# Patient Record
Sex: MALE | Race: OTHER | Employment: FULL TIME | URBAN - METROPOLITAN AREA
[De-identification: names, ages, dates, MRNs, and addresses within clinical notes are randomized per-mention and may not be internally consistent; named-entity substitution may affect disease eponyms.]

---

## 2020-08-16 ENCOUNTER — HOSPITAL ENCOUNTER (EMERGENCY)
Age: 20
Discharge: HOME | End: 2020-08-16
Attending: EMERGENCY MEDICINE

## 2020-08-16 VITALS
HEIGHT: 71 IN | OXYGEN SATURATION: 99 % | HEART RATE: 60 BPM | TEMPERATURE: 98.9 F | RESPIRATION RATE: 24 BRPM | BODY MASS INDEX: 32.62 KG/M2 | SYSTOLIC BLOOD PRESSURE: 115 MMHG | WEIGHT: 233.03 LBS | DIASTOLIC BLOOD PRESSURE: 67 MMHG

## 2020-08-16 PROCEDURE — 99282 EMERGENCY DEPT VISIT SF MDM: CPT

## 2020-08-16 RX ORDER — AMOXICILLIN AND CLAVULANATE POTASSIUM 875; 125 MG/1; MG/1
1 TABLET, FILM COATED ORAL 2 TIMES DAILY
Qty: 20 TABLET | Refills: 0 | Status: SHIPPED | OUTPATIENT
Start: 2020-08-16 | End: 2020-08-26

## 2020-08-16 NOTE — ED NOTES
Patient states his dog bit him in the left thumb last night. Superficial laceration scratches to left thumb and hand. Areas cleaned, neosporin and band aid applied followed by ace wrap.      Yesenia Noriega RN  08/16/20 7379

## 2020-08-16 NOTE — ED PROVIDER NOTES
eMERGENCY dEPARTMENT eNCOUnter      Pt Name: Larry Flores  MRN: [id number]  [place] 2000  Date of evaluation: 8/16/2020  Provider: Bren Steward MD     CHIEF COMPLAINT       Chief Complaint   Patient presents with   [address] by his dog last night on his right hand - Not actively bleeding - Dog up to date on shots - PT up to date on tetanus          HISTORY OF PRESENT ILLNESS   (Location/Symptom, Timing/Onset,Context/Setting, Quality, Duration, Modifying Factors, Severity) Note limiting factors. HPI    Larry Flores is a 23 y.o. male who presents to the emergency department with a dog bite started yesterday. Patient states his dog was running through the garbage she tried to reach and and the dog bit him in the right hand. There was distal superficial puncture wounds and scratches noted. Patient missed work today and requested a work note. Patient otherwise tetanus is up-to-date. Patient denies any redness or pain. Mild swelling noted. There was no other injury no active bleeding. Nursing Notes were reviewed. REVIEW OFSYSTEMS    (2+ for level 4; 10+ for level 5)   Review of Systems    General: No fevers, chills or night sweats, No weight loss    Head:  No Sore throat,  No Ear Pain    Chest:  Nontender. No Cough, No SOB,  Chest Pain    GI: No abdominal pain or vomiting    : No dysuria or hematuria    Musculoskeletal: No unrelenting pain or night pain    Neurologic: No bowel or bladder incontinence, No saddle anesthesia, No leg weakness    All other systems reviewed and are negative. PAST MEDICAL HISTORY     Past Medical History:   Diagnosis Date    Allergic rhinitis        SURGICAL HISTORY     History reviewed. No pertinent surgical history. CURRENT MEDICATIONS       Discharge Medication List as of 8/16/2020  4:12 PM          ALLERGIES     Patient has no known allergies. FAMILY HISTORY     History reviewed. No pertinent family history.      SOCIAL HISTORY Social History     Socioeconomic History    Marital status: Single     Spouse name: None    Number of children: None    Years of education: None    Highest education level: None   Occupational History    None   Social Needs    Financial resource strain: None    Food insecurity     Worry: None     Inability: None    Transportation needs     Medical: None     Non-medical: None   Tobacco Use    Smoking status: Passive Smoke Exposure - Never Smoker    Smokeless tobacco: Never Used   Substance and Sexual Activity    Alcohol use: No    Drug use: No    Sexual activity: Not Currently   Lifestyle    Physical activity     Days per week: None     Minutes per session: None    Stress: None   Relationships    Social connections     Talks on phone: None     Gets together: None     Attends Druze service: None     Active member of club or organization: None     Attends meetings of clubs or organizations: None     Relationship status: None    Intimate partner violence     Fear of current or ex partner: None     Emotionally abused: None     Physically abused: None     Forced sexual activity: None   Other Topics Concern    None   Social History Narrative    None       SCREENINGS           PHYSICAL EXAM    (up to 7 for level 4, 8 or more for level 5)     ED Triage Vitals [08/16/20 1553]   BP Temp Temp Source Pulse Resp SpO2 Height Weight   115/67 98.9 °F (37.2 °C) Oral 60 24 99 % 5' 11\" (1.803 m) 233 lb 0.4 oz (105.7 kg)       Physical Exam    General: Alert and awake ×3. Nontoxic appearance. Well-developed well-nourished healthy 65-year-old male in no distress  HEENT: Normocephalic atraumatic. Neck is supple. Airway intact. No adenopathy  Cardiac: Regular rate and rhythm with no murmurs rubs or gallops  Pulmonary: Lungs are clear in all lung fields. No wheezing. No Rales. Abdomen: Soft and nontender. Negative hepatosplenomegaly. Bowel sounds are active  Extremities: Moving all extremities.   No  calf tenderness. Peripheral pulses all intact  Skin: No skin lesions. No rashes. The skin on the right hand shows couple small puncture wounds from scratches and abrasion. Nothing that requires sutures. No evidence of cellulitis or adenitis. No foreign body seen. No x-ray indicated. Neurologic: Cranial nerves II through XII was grossly intact. Nonfocal neurological exam  Psychiatric: Patient is pleasant. Mood is appropriate. DIAGNOSTIC RESULTS     EKG (Per Emergency Physician):       RADIOLOGY (Per Emergency Physician): Interpretation per the Radiologist below, if available at the time of this note:  No results found. ED BEDSIDE ULTRASOUND:   Performed by ED Physician - none    LABS:  Labs Reviewed - No data to display     All other labs were within normal range or not returned as of this dictation. Procedures      EMERGENCY DEPARTMENT COURSE and DIFFERENTIAL DIAGNOSIS/MDM:   Vitals:    Vitals:    08/16/20 1553   BP: 115/67   Pulse: 60   Resp: 24   Temp: 98.9 °F (37.2 °C)   TempSrc: Oral   SpO2: 99%   Weight: 233 lb 0.4 oz (105.7 kg)   Height: 5' 11\" (1.803 m)       Medications - No data to display    MDM. Patient is a 76-year-old with a dog bite to the right hand. Patient denies any further injury. Exam is unremarkable except for the redness and a small puncture wound. Patient will be placed on Augmentin for 10 days. Work note for today. Patient discharged in good condition. REVAL:         CRITICAL CARE TIME   Total CriticalCare time was 0 minutes, excluding separately reportable procedures. There was a high probability of clinically significant/life threatening deterioration in the patient's condition which required my urgent intervention. CONSULTS:  None    PROCEDURES:  Unless otherwise noted below, none     [unfilled]    FINAL IMPRESSION      1. Puncture wound of right hand without foreign body, initial encounter    2.  Dog bite, initial encounter          DISPOSITION/PLAN DISPOSITION Decision To Discharge 08/16/2020 04:04:57 PM      PATIENT REFERRED TO:  MILADY [address]  [address] [facility]  [phone]    Schedule an appointment as soon as possible for a visit in 1 week  If symptoms worsen      DISCHARGE MEDICATIONS:  Discharge Medication List as of 8/16/2020  4:12 PM      START taking these medications    Details   amoxicillin-clavulanate (AUGMENTIN) 875-125 MG per tablet Take 1 tablet by mouth 2 times daily for 10 days, Disp-20 tablet,R-0Print                (Please note:  Portions of this note were completed with a voice recognition program.Efforts were made to edit the dictations but occasionally words and phrases are mis-transcribed.)  Form v2016. J.5-cn    RIVKA CALZADA MD (electronically signed)  Emergency Medicine Provider        Monse Acosta MD  08/16/20 9545

## 2020-08-16 NOTE — ED NOTES
AVS reviewed with patient. Verbalized understanding. AVS was printed and given to patient. Printed prescription given to patient.      Christine Huynh RN  08/16/20 2553

## 2021-06-15 ENCOUNTER — APPOINTMENT (OUTPATIENT)
Dept: GENERAL RADIOLOGY | Age: 21
End: 2021-06-15

## 2021-06-15 ENCOUNTER — HOSPITAL ENCOUNTER (EMERGENCY)
Age: 21
Discharge: HOME | End: 2021-06-15
Attending: EMERGENCY MEDICINE

## 2021-06-15 VITALS
DIASTOLIC BLOOD PRESSURE: 82 MMHG | HEIGHT: 72 IN | OXYGEN SATURATION: 100 % | BODY MASS INDEX: 35.41 KG/M2 | RESPIRATION RATE: 14 BRPM | TEMPERATURE: 98.3 F | HEART RATE: 58 BPM | WEIGHT: 261.47 LBS | SYSTOLIC BLOOD PRESSURE: 129 MMHG

## 2021-06-15 DIAGNOSIS — S60.00XA CONTUSION OF RIGHT HAND INCLUDING FINGERS, INITIAL ENCOUNTER: Primary | ICD-10-CM

## 2021-06-15 DIAGNOSIS — S60.221A CONTUSION OF RIGHT HAND INCLUDING FINGERS, INITIAL ENCOUNTER: Primary | ICD-10-CM

## 2021-06-15 PROCEDURE — 6370000000 HC RX 637 (ALT 250 FOR IP): Performed by: EMERGENCY MEDICINE

## 2021-06-15 PROCEDURE — 99284 EMERGENCY DEPT VISIT MOD MDM: CPT

## 2021-06-15 PROCEDURE — 73130 X-RAY EXAM OF HAND: CPT

## 2021-06-15 RX ORDER — NAPROXEN 250 MG/1
500 TABLET ORAL ONCE
Status: COMPLETED | OUTPATIENT
Start: 2021-06-15 | End: 2021-06-15

## 2021-06-15 RX ORDER — NAPROXEN 500 MG/1
500 TABLET ORAL 2 TIMES DAILY WITH MEALS
Qty: 20 TABLET | Refills: 0 | Status: SHIPPED | OUTPATIENT
Start: 2021-06-15 | End: 2021-06-25

## 2021-06-15 RX ADMIN — NAPROXEN 500 MG: 250 TABLET ORAL at 10:18

## 2021-06-15 ASSESSMENT — PAIN DESCRIPTION - LOCATION: LOCATION: HAND

## 2021-06-15 ASSESSMENT — PAIN DESCRIPTION - DESCRIPTORS: DESCRIPTORS: ACHING;SORE

## 2021-06-15 ASSESSMENT — PAIN DESCRIPTION - ORIENTATION: ORIENTATION: RIGHT

## 2021-06-15 ASSESSMENT — PAIN SCALES - GENERAL
PAINLEVEL_OUTOF10: 6
PAINLEVEL_OUTOF10: 6

## 2021-06-15 ASSESSMENT — PAIN DESCRIPTION - FREQUENCY: FREQUENCY: CONTINUOUS

## 2021-06-15 ASSESSMENT — PAIN DESCRIPTION - PROGRESSION: CLINICAL_PROGRESSION: NOT CHANGED

## 2021-06-15 ASSESSMENT — PAIN DESCRIPTION - ONSET: ONSET: ON-GOING

## 2021-06-15 ASSESSMENT — PAIN DESCRIPTION - PAIN TYPE: TYPE: ACUTE PAIN

## 2021-06-15 ASSESSMENT — PAIN - FUNCTIONAL ASSESSMENT: PAIN_FUNCTIONAL_ASSESSMENT: 0-10
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
## 2021-06-15 NOTE — ED NOTES
Dc'd to home  Awake alert  resp easy ad unlabored  Skin warm and dry  Aware to use hand but to rest it  To ice and elevate  Rest hand  Work note given  Walked out with ease     Troy Fitzpatrick RN  06/15/21 9817

## 2021-06-15 NOTE — LETTER
[facility] [address]  Phone: [phone]               Vivienne 15, 2021    Patient: Rory Fontanez   YOB: 2000   Date of Visit: 6/15/2021       To Whom It May Concern:    Rory oFntanez was seen and treated in our emergency department on 6/15/2021. He may return to work on June 16th. Off work June 14th and 15th.       Sincerely,       Raissa Adrian RN         Signature:__________________________________

## 2021-11-18 ENCOUNTER — HOSPITAL ENCOUNTER (EMERGENCY)
Age: 21
Discharge: HOME | End: 2021-11-18
Attending: EMERGENCY MEDICINE

## 2021-11-18 VITALS
BODY MASS INDEX: 38.33 KG/M2 | TEMPERATURE: 97.8 F | RESPIRATION RATE: 19 BRPM | WEIGHT: 258.82 LBS | HEART RATE: 94 BPM | OXYGEN SATURATION: 100 % | SYSTOLIC BLOOD PRESSURE: 127 MMHG | DIASTOLIC BLOOD PRESSURE: 66 MMHG | HEIGHT: 69 IN
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
## 2021-11-18 DIAGNOSIS — S40.022A ARM CONTUSION, LEFT, INITIAL ENCOUNTER: Primary | ICD-10-CM

## 2021-11-18 PROCEDURE — 99283 EMERGENCY DEPT VISIT LOW MDM: CPT

## 2021-11-18 ASSESSMENT — PAIN DESCRIPTION - ORIENTATION: ORIENTATION: LEFT

## 2021-11-18 ASSESSMENT — PAIN SCALES - GENERAL
PAINLEVEL_OUTOF10: 6
PAINLEVEL_OUTOF10: 6

## 2021-11-18 ASSESSMENT — PAIN DESCRIPTION - PAIN TYPE: TYPE: ACUTE PAIN

## 2021-11-18 ASSESSMENT — PAIN DESCRIPTION - LOCATION: LOCATION: ARM

## 2021-11-18 ASSESSMENT — PAIN - FUNCTIONAL ASSESSMENT: PAIN_FUNCTIONAL_ASSESSMENT: 0-10

## 2021-11-18 ASSESSMENT — PAIN DESCRIPTION - DESCRIPTORS: DESCRIPTORS: DULL
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
## 2021-11-18 NOTE — Clinical Note
Fercho Bergman was seen and treated in our emergency department on 11/18/2021. He may return to work on 11/19/2021. If you have any questions or concerns, please don't hesitate to call.       Shant Dotson MD

## 2021-12-29 ENCOUNTER — HOSPITAL ENCOUNTER (EMERGENCY)
Age: 21
Discharge: HOME | End: 2021-12-29
Attending: EMERGENCY MEDICINE

## 2021-12-29 VITALS
TEMPERATURE: 98.1 F | HEART RATE: 72 BPM | BODY MASS INDEX: 37.98 KG/M2 | HEIGHT: 69 IN | WEIGHT: 256.39 LBS | RESPIRATION RATE: 16 BRPM | OXYGEN SATURATION: 98 % | DIASTOLIC BLOOD PRESSURE: 89 MMHG | SYSTOLIC BLOOD PRESSURE: 145 MMHG

## 2021-12-29 DIAGNOSIS — U07.1 COVID-19 VIRUS INFECTION: Primary | ICD-10-CM

## 2021-12-29 PROCEDURE — U0005 INFEC AGEN DETEC AMPLI PROBE: HCPCS

## 2021-12-29 PROCEDURE — U0003 INFECTIOUS AGENT DETECTION BY NUCLEIC ACID (DNA OR RNA); SEVERE ACUTE RESPIRATORY SYNDROME CORONAVIRUS 2 (SARS-COV-2) (CORONAVIRUS DISEASE [COVID-19]), AMPLIFIED PROBE TECHNIQUE, MAKING USE OF HIGH THROUGHPUT TECHNOLOGIES AS DESCRIBED BY CMS-2020-01-R: HCPCS

## 2021-12-29 PROCEDURE — 99283 EMERGENCY DEPT VISIT LOW MDM: CPT

## 2021-12-29 ASSESSMENT — PAIN DESCRIPTION - PAIN TYPE
TYPE: ACUTE PAIN
TYPE: ACUTE PAIN

## 2021-12-29 ASSESSMENT — PAIN DESCRIPTION - ONSET
ONSET: ON-GOING
ONSET: ON-GOING

## 2021-12-29 ASSESSMENT — PAIN SCALES - GENERAL
PAINLEVEL_OUTOF10: 7
PAINLEVEL_OUTOF10: 7

## 2021-12-29 ASSESSMENT — PAIN - FUNCTIONAL ASSESSMENT
PAIN_FUNCTIONAL_ASSESSMENT: ACTIVITIES ARE NOT PREVENTED
PAIN_FUNCTIONAL_ASSESSMENT: ACTIVITIES ARE NOT PREVENTED
PAIN_FUNCTIONAL_ASSESSMENT: 0-10

## 2021-12-29 ASSESSMENT — PAIN DESCRIPTION - LOCATION
LOCATION: GENERALIZED
LOCATION: GENERALIZED

## 2021-12-29 ASSESSMENT — PAIN DESCRIPTION - DESCRIPTORS
DESCRIPTORS: ACHING
DESCRIPTORS: ACHING

## 2021-12-29 NOTE — Clinical Note
Katheryn Briceno was seen and treated in our emergency department on 12/29/2021. He may return to work on 01/05/2022. If you have any questions or concerns, please don't hesitate to call.       Lane Carson MD

## 2021-12-30 ENCOUNTER — CARE COORDINATION (OUTPATIENT)
Dept: CARE COORDINATION | Age: 21
End: 2021-12-30

## 2021-12-30 LAB — SARS-COV-2: DETECTED

## 2022-01-01 NOTE — RESULT ENCOUNTER NOTE
The patient is aware of positive results due to log in to My Chart on 12/30/21. The COVID-19 test result was positive  Treatment of coronavirus does not require an antibiotic  Remain isolated for 10 days since symptoms first appeared AND At least 3 days have passed after recovery (Recovery is defined as resolution of fever without the use of fever-reducing medications with progressive improvement or resolution of other symptoms). Wash hands often with soap and water for at least 20 seconds or alternatively use hand  with at least 60% alcohol content  Cover coughs and sneezes  Wear a mask when around others if possible  Clean all \"high-touch\" surfaces every day, such as doorknobs and cellphones  Continually monitor symptoms. Contact a medical provider if symptoms are worsening, such as difficulty breathing. For additional information, please visit the Centers for Disease Control and Prevention ([facility].cy.

## 2022-12-31 ENCOUNTER — HOSPITAL ENCOUNTER (EMERGENCY)
Age: 22
Discharge: HOME | End: 2022-12-31

## 2022-12-31 VITALS
DIASTOLIC BLOOD PRESSURE: 84 MMHG | BODY MASS INDEX: 40.49 KG/M2 | RESPIRATION RATE: 17 BRPM | HEIGHT: 69 IN | HEART RATE: 72 BPM | WEIGHT: 273.37 LBS | TEMPERATURE: 98.6 F | OXYGEN SATURATION: 97 % | SYSTOLIC BLOOD PRESSURE: 132 MMHG

## 2022-12-31 DIAGNOSIS — H57.12 LEFT EYE PAIN: Primary | ICD-10-CM

## 2022-12-31 PROCEDURE — 99282 EMERGENCY DEPT VISIT SF MDM: CPT

## 2022-12-31 NOTE — ED NOTES
Patient presents to ED with L eye pain. He states he had a migraine earlier in the day and it made his vision \"a little foggy\". He states he also needs glasses but hasn't gotten them since. He states that the pain is gone at this point, but his migraine is lingering a bit. He states he got some relief when he put sunglasses on. States his boss wanted him to come get checked out. He is alert and oriented x4, vitals are WNL, airway is patent.       Corrina Ornelas RN  12/31/22 9345

## 2022-12-31 NOTE — ED PROVIDER NOTES
[address]        Pt Name: May Warren  MRN: [id number]  [place] 2000  Date of evaluation: 12/31/2022  Provider: PRASHANT Devries      ADVANCED PRACTICE PROVIDER, I HAVE EVALUATED THIS PATIENT    CHIEF COMPLAINT     Left eye pain      HISTORY OF PRESENT ILLNESS  (Location/Symptom, Timing/Onset, Context/Setting, Quality, Duration,Modifying Factors, Severity.)   May Warren is a 20 y.o. male who presents to the emergency department for left eye pain and headache. Reports he was at work about 1 hour ago and he started with left eye pain, light sensitivity, blurred vision, and then headache. He works at the [employer] so there are a lot of lights there which made symptoms worse. He put his sunglasses on and symptoms improved. Denies prior episodes. Reports he has been having difficulty with vision for awhile now and needs to get glasses. Does not have insurance so has not seen eye doctor yet. Has been squinting at lot at work. Symptoms started after being at work all day. Denies seeing spots or floaters or curtains falling. Denies nausea vomiting. Denies health problems. Does not take any medications. Denies ASA or anticoagulant use. Reports symptoms lasted for about 20 minutes and resolved. He denies eye pain or blurred vision or headache now. Feels normal now. Nursing Notes were reviewed and I agree. REVIEW OF SYSTEMS    (2-9 systems for level 4, 10 or more for level 5)   Review of Systems     Pertinent positives and negatives as per HPI. All other systems reviewed and are negative except as noted    PAST MEDICAL HISTORY         Diagnosis Date    Allergic rhinitis        SURGICAL HISTORY   History reviewed. No pertinent surgical history.     CURRENT MEDICATIONS       Discharge Medication List as of 12/31/2022  7:03 PM        CONTINUE these medications which have NOT CHANGED    Details   naproxen (NAPROSYN) 500 MG tablet Take 1 tablet by mouth 2 times daily (with meals) for 10 days, Disp-20 tablet, R-0Print             ALLERGIES     Patient has no known allergies. FAMILY HISTORY     History reviewed. No pertinent family history. No family status information on file. SOCIAL HISTORY      reports that he is a non-smoker but has been exposed to tobacco smoke. He has never used smokeless tobacco. He reports that he does not drink alcohol and does not use drugs. PHYSICAL EXAM    (up to 7 for level 4, 8 or more for level 5)     ED Triage Vitals [12/31/22 1824]   BP Temp Temp Source Heart Rate Resp SpO2 Height Weight   132/84 98.6 °F (37 °C) Oral 72 17 97 % 5' 9\" (1.753 m) 273 lb 5.9 oz (124 kg)       Physical Exam  Constitutional:       General: He is not in acute distress. Appearance: Normal appearance. He is well-developed. He is not ill-appearing, toxic-appearing or diaphoretic. HENT:      Head: Normocephalic and atraumatic. Eyes:      Extraocular Movements: Extraocular movements intact. Conjunctiva/sclera: Conjunctivae normal.      Pupils: Pupils are equal, round, and reactive to light. Comments: No nystagmus   Pulmonary:      Effort: Pulmonary effort is normal. No respiratory distress. Musculoskeletal:         General: Normal range of motion. Cervical back: Normal range of motion and neck supple. Skin:     General: Skin is warm. Neurological:      Mental Status: He is alert. Comments: No facial drooping  Normal finger to nose bilaterally   Psychiatric:         Mood and Affect: Mood normal.         Behavior: Behavior normal.         Thought Content:  Thought content normal.         Judgment: Judgment normal.       DIFFERENTIAL DIAGNOSIS   Atypical migraine, aura, glaucoma, retinal artery occlusion, dissection, intracranial mass/bleed, other    DIAGNOSTICRESULTS         RADIOLOGY:   Non-plain film images such as CT, Ultrasound and MRI are read by the radiologist. Plain radiographic images are visualized and preliminarily interpreted by PRASHANT Cuevas with the below findings:      Interpretation per the Radiologist below, if available at the time of this note:    No orders to display         LABS:  Labs Reviewed - No data to display    All other labs were within normal range or not returned as of this dictation. EMERGENCY DEPARTMENT COURSE and DIFFERENTIALDIAGNOSIS/MDM:   Vitals:    Vitals:    12/31/22 1824   BP: 132/84   Pulse: 72   Resp: 17   Temp: 98.6 °F (37 °C)   TempSrc: Oral   SpO2: 97%   Weight: 273 lb 5.9 oz (124 kg)   Height: 5' 9\" (1.753 m)       Patient wasnontoxic, well appearing, afebrile with normal vital signs. Saturating well on room air. Presented for left eye pain, light sensitivity, blurred vision and headache in the setting of being at work at the [employer] with lots of lights and squinting all day. He currently denies headache or vision changes. He does not think he needs to be in ED but his boss encouraged him to come so he is here. His eye appears normal.      DDx includes atypical migraine, aura, glaucoma, retinal artery occlusion, other    His symptoms are totally resolved. I think most likely cause is atypical migraine with aura or eye issue as this happened at work, with lots of lights, and squinting all day from his chronic vision issues/needing glasses. Recommended FU with [facility]. Also on differential are more serious pathology like dissection, mass, bleed. Offered CT head and CTA head and neck to patient but he declined. Does not think he needs workup and does not think he needs to be here. Instructed to FU with [facility] and return for worsening. He agreed and understood. Is this patient to be included in the SEP-1 Core Measure due to severe sepsis or septic shock? No   Exclusion criteria - the patient is NOT to be included for SEP-1 Core Measure due to: Infection is not suspected        PROCEDURES:  None    FINAL IMPRESSION      1.  Left eye pain        DISPOSITION/PLAN   DISPOSITION Decision To Discharge 12/31/2022 06:57:33 PM      PATIENT REFERRED TO:  [facility]  [phone]  Schedule an appointment as soon as possible for a visit   For follow up appointment    [facility] [id number]  [phone]    As needed, If symptoms worsen    DISCHARGE MEDICATIONS:  Discharge Medication List as of 12/31/2022  7:03 PM          (Please note that portions ofthis note were completed with a voice recognition program.  Efforts were made to edit the dictations but occasionally words are mis-transcribed.)    Adelfo Baires, [address], [address] [facility] [place]  12/31/22 1313

## 2023-03-18 ENCOUNTER — HOSPITAL ENCOUNTER (EMERGENCY)
Age: 23
Discharge: HOME | End: 2023-03-19
Attending: EMERGENCY MEDICINE

## 2023-03-18 VITALS
SYSTOLIC BLOOD PRESSURE: 160 MMHG | OXYGEN SATURATION: 100 % | DIASTOLIC BLOOD PRESSURE: 82 MMHG | RESPIRATION RATE: 18 BRPM | HEART RATE: 69 BPM | TEMPERATURE: 98.4 F

## 2023-03-18 DIAGNOSIS — J20.9 ACUTE BRONCHITIS, UNSPECIFIED ORGANISM: Primary | ICD-10-CM

## 2023-03-18 DIAGNOSIS — R11.2 NAUSEA AND VOMITING, UNSPECIFIED VOMITING TYPE: ICD-10-CM

## 2023-03-18 LAB — SARS-COV-2 RDRP RESP QL NAA+PROBE: NOT DETECTED

## 2023-03-18 PROCEDURE — 87635 SARS-COV-2 COVID-19 AMP PRB: CPT

## 2023-03-18 PROCEDURE — 99283 EMERGENCY DEPT VISIT LOW MDM: CPT

## 2023-03-18 NOTE — Clinical Note
Vincent Mcclain was seen and treated in our emergency department on 3/18/2023. He may return to work on 03/20/2023. If you have any questions or concerns, please don't hesitate to call.       Zeke Black, DO

## 2023-03-19 RX ORDER — DOXYCYCLINE HYCLATE 100 MG
100 TABLET ORAL 2 TIMES DAILY
Qty: 20 TABLET | Refills: 0 | Status: SHIPPED | OUTPATIENT
Start: 2023-03-19 | End: 2023-03-29

## 2023-03-19 RX ORDER — ONDANSETRON 4 MG/1
4 TABLET, ORALLY DISINTEGRATING ORAL 3 TIMES DAILY PRN
Qty: 21 TABLET | Refills: 0 | Status: SHIPPED | OUTPATIENT
Start: 2023-03-19

## 2023-03-19 RX ORDER — BENZONATATE 100 MG/1
100 CAPSULE ORAL 3 TIMES DAILY PRN
Qty: 21 CAPSULE | Refills: 0 | Status: SHIPPED | OUTPATIENT
Start: 2023-03-19 | End: 2023-03-26

## 2023-03-19 NOTE — ED PROVIDER NOTES
[address] [place] [address] ENCOUNTER      Pt Name: Dora Nicole  MRN: [id number]  [place] 2000  Date of evaluation: 3/18/2023  Provider: Sameer Rabago, [address] [place] [address]       Chief Complaint   Patient presents with    Covid Testing     Pt wants covid testing. PT reports not being able to smell/taste. HISTORY OF PRESENT ILLNESS   (Location/Symptom, Timing/Onset, Context/Setting, Quality, Duration, Modifying Factors, Severity)  Note limiting factors. Dora Nicole is a 24 y.o. male who presents to the emergency department with complaint of request for COVID test.  He states that several coworkers have been sick and he believes that one of them may have had COVID. He works at a [employer] and is exposed to [employer]. He states that yesterday he developed a cough with yellow productive sputum. He has had some nausea and vomited 2 or 3 times today. No diarrhea. He lost his taste of smell. He reports nasal congestion. He denies any sore throat or earache. No neck pain or stiffness. He reports a slight headache. He denies any back pain or flank pain. He denies any chest pain heaviness pressure tightness shortness of breath or dyspnea on exertion. He denies any abdominal pain leg pain or calf pain. No dysuria or hematuria. No melena hematochezia. No syncope or palpitations. He does not smoke. He denies any history of asthma. Nursing Notes were reviewed. HPI        REVIEW OF SYSTEMS    (2-9 systems for level 4, 10 or more for level 5)       Constitutional: Negative for fever or chills. HENT: Negative for rhinorrhea and sore throat. Eyes: Negative for redness or drainage. Respiratory: Negative for shortness of breath or dyspnea on exertion. Cardiovascular: Negative for chest pain. Gastrointestinal: Negative for abdominal pain. Genitourinary: Negative for flank pain. Negative for dysuria. Negative for hematuria.    Neurological: Negative for headache. Musculoskeletal:  Negative edema. Hematological: Negative for adenopathy. All systems are reviewed and are negative except for those listed above in the history of present illness and ROS. PAST MEDICAL HISTORY     Past Medical History:   Diagnosis Date    Allergic rhinitis          SURGICAL HISTORY     History reviewed. No pertinent surgical history. CURRENT MEDICATIONS       Previous Medications    NAPROXEN (NAPROSYN) 500 MG TABLET    Take 1 tablet by mouth 2 times daily (with meals) for 10 days       ALLERGIES     Patient has no known allergies. FAMILY HISTORY     History reviewed. No pertinent family history. SOCIAL HISTORY       Social History     Socioeconomic History    Marital status: Single     Spouse name: None    Number of children: None    Years of education: None    Highest education level: None   Tobacco Use    Smoking status: Passive Smoke Exposure - Never Smoker    Smokeless tobacco: Never   Substance and Sexual Activity    Alcohol use: No    Drug use: No    Sexual activity: Not Currently       SCREENINGS    [place] Coma Scale  Eye Opening: Spontaneous  Best Verbal Response: Oriented  Best Motor Response: Obeys commands  [place] Coma Scale Score: 15        PHYSICAL EXAM    (up to 7 for level 4, 8 or more for level 5)     ED Triage Vitals   BP Temp Temp Source Heart Rate Resp SpO2 Height Weight   03/18/23 2245 03/18/23 2252 03/18/23 2252 03/18/23 2252 03/18/23 2252 03/18/23 2245 -- --   (!) 160/82 98.4 °F (36.9 °C) Oral 69 18 100 %           Physical Exam   Constitutional: Awake and alert. Very pleasant. Well-appearing. Occasional cough was noted. Head: No visible evidence of trauma. Normocephalic. Eyes: Pupils equal and reactive. No photophobia. Conjunctiva normal.    HENT: Oral mucosa moist.  Airway patent. Pharynx without erythema. Nares were clear. Neck:  Soft and supple. Nontender. Heart:  Regular rate and rhythm.   No murmur. Lungs:  Clear to auscultation. No wheezes, rales, or ronchi. No conversational dyspnea or accessory muscle use. Chest: Chest wall non-tender. No evidence of trauma. Abdomen:  Soft, nondistended, bowel sounds present. Nontender. No guarding rigidity or rebound. No masses. Musculoskeletal: Extremities non-tender with full range of motion. Radial and dorsalis pedis pulses were intact. No calf tenderness erythema or edema. Neurological: Alert and oriented x 3. Speech clear. No acute focal motor or sensory deficits. Skin: Skin is warm and dry. No rash. Lymphatic:  No lympadenopathy. Psychiatric: Normal mood and affect. Behavior is normal.         DIAGNOSTIC RESULTS     EKG: All EKG's are interpreted by me, the Emergency Department Physician, who either signs or Co-signs this chart in the absence of a cardiologist.        RADIOLOGY:   Non-plain film images such as CT, Ultrasound and MRI are read by the radiologist. Plain radiographic images are visualized and preliminarily interpreted by the emergency physician with the below findings:        Interpretation per the Radiologist below, if available at the time of this note:    No orders to display         ED BEDSIDE ULTRASOUND:   Performed by ED Physician - none    LABS:  Labs Reviewed   COVID-19, RAPID       All other labs were within normal range or not returned as of this dictation. EMERGENCY DEPARTMENT COURSE and DIFFERENTIAL DIAGNOSIS/ MEDICAL DECISION MAKING:   Vitals:    Vitals:    03/18/23 2245 03/18/23 2252   BP: (!) 160/82    Pulse:  69   Resp:  18   Temp:  98.4 °F (36.9 °C)   TempSrc:  Oral   SpO2: 100% 100%         MDM      The patient presents with cough nausea vomiting, loss of smell, slight headache, nasal congestion as noted above. He primarily request a COVID test.  COVID test was ordered. Lungs are clear to auscultation. No evidence of respiratory distress. He is hemodynamically stable. He is afebrile.   No evidence of hypoxia. Oxygen saturation is 100% on room air. Social Determinants (Poverty, Education, uninsured) -none     Is this patient to be included in the SEP-1 Core Measure due to severe sepsis or septic shock? No   Exclusion criteria - the patient is NOT to be included for SEP-1 Core Measure due to:  2+ SIRS criteria are not met      REASSESSMENT/ MEDICAL DECISION MAKING          COVID test was obtained and is negative. Patient is stable for discharge and outpatient management. He will be given a prescription for Zofran 4 mg ODT, doxycycline 100 mg twice daily to treat acute bronchitis, and he requested a work note. Drink plenty of fluids. Follow-up with a primary care physician in 1 to 2 days for reexamination. Follow a clear liquid diet for 24 hours. Advance to a bland diet as tolerated. If condition worsens or new symptoms develop, return immediately to the emergency department. I am the primary attending of record. CRITICAL CARE TIME   Total Critical Care time was 0 minutes, excluding separately reportable procedures. There was a high probability of clinically significant/life threatening deterioration in the patient's condition which required my urgent intervention. CONSULTS:  None    PROCEDURES:  Unless otherwise noted below, none     Procedures        FINAL IMPRESSION      1. Acute bronchitis, unspecified organism    2.  Nausea and vomiting, unspecified vomiting type          DISPOSITION/PLAN   DISPOSITION Decision To Discharge 03/19/2023 12:14:44 AM      PATIENT REFERRED TO:  [facility]) Referral  Call [phone] for an appointment  Call today      DISCHARGE MEDICATIONS:  New Prescriptions    BENZONATATE (TESSALON PERLES) 100 MG CAPSULE    Take 1 capsule by mouth 3 times daily as needed for Cough    DOXYCYCLINE HYCLATE (VIBRA-TABS) 100 MG TABLET    Take 1 tablet by mouth 2 times daily for 10 days    ONDANSETRON (ZOFRAN-ODT) 4 MG DISINTEGRATING TABLET    Take 1 tablet by mouth 3 times daily as needed for Nausea or Vomiting     Controlled Substances Monitoring:     No flowsheet data found. (Please note that portions of this note were completed with a voice recognition program.  Efforts were made to edit the dictations but occasionally words are mis-transcribed. )    1859 Anshu Rabago DO (electronically signed)  Attending Emergency Physician           Leonor Plummer DO  03/19/23 3708

## 2023-03-19 NOTE — ED NOTES
Discharge instructions explained by ED provider. Patient verbalized understanding and denies any other concerns or complaints at this time. Patient vital signs stable and no acute signs or symptoms of distress noted at discharge. Patient deemed clinically stable. Patient d/c home.      Tonie Castaneda RN  03/19/23 [id number]

## 2024-10-27 ENCOUNTER — APPOINTMENT (OUTPATIENT)
Dept: GENERAL RADIOLOGY | Age: 24
End: 2024-10-27

## 2024-10-27 ENCOUNTER — HOSPITAL ENCOUNTER (EMERGENCY)
Age: 24
Discharge: HOME | End: 2024-10-27

## 2024-10-27 VITALS
OXYGEN SATURATION: 99 % | HEIGHT: 71 IN | DIASTOLIC BLOOD PRESSURE: 88 MMHG | RESPIRATION RATE: 16 BRPM | TEMPERATURE: 98 F | WEIGHT: 245.15 LBS | HEART RATE: 60 BPM | BODY MASS INDEX: 34.32 KG/M2 | SYSTOLIC BLOOD PRESSURE: 136 MMHG

## 2024-10-27 DIAGNOSIS — S93.402A SPRAIN OF LEFT ANKLE, UNSPECIFIED LIGAMENT, INITIAL ENCOUNTER: Primary | ICD-10-CM

## 2024-10-27 PROCEDURE — 73610 X-RAY EXAM OF ANKLE: CPT

## 2024-10-27 PROCEDURE — 99283 EMERGENCY DEPT VISIT LOW MDM: CPT

## 2024-10-27 PROCEDURE — 6370000000 HC RX 637 (ALT 250 FOR IP): Performed by: GENERAL ACUTE CARE HOSPITAL

## 2024-10-27 RX ORDER — IBUPROFEN 400 MG/1
800 TABLET, FILM COATED ORAL ONCE
Status: COMPLETED | OUTPATIENT
Start: 2024-10-27 | End: 2024-10-27

## 2024-10-27 RX ADMIN — IBUPROFEN 800 MG: 400 TABLET, FILM COATED ORAL at 21:32

## 2024-10-27 ASSESSMENT — PAIN SCALES - GENERAL
PAINLEVEL_OUTOF10: 3
PAINLEVEL_OUTOF10: 6
PAINLEVEL_OUTOF10: 5

## 2024-10-27 ASSESSMENT — PAIN DESCRIPTION - LOCATION
LOCATION: FOOT
LOCATION: FOOT

## 2024-10-27 ASSESSMENT — PAIN - FUNCTIONAL ASSESSMENT: PAIN_FUNCTIONAL_ASSESSMENT: 0-10

## 2024-10-27 ASSESSMENT — PAIN DESCRIPTION - DESCRIPTORS
DESCRIPTORS: ACHING
DESCRIPTORS: ACHING

## 2024-10-27 ASSESSMENT — PAIN DESCRIPTION - ORIENTATION
ORIENTATION: LEFT
ORIENTATION: LEFT

## 2024-10-28 NOTE — ED PROVIDER NOTES
**ADVANCED PRACTICE PROVIDER, I HAVE EVALUATED THIS PATIENT**        [facility] EMERGENCY DEPARTMENT  EMERGENCY DEPARTMENT ENCOUNTER      Pt Name: Dung Johns  MRN:[id number]  Birthdate 2000  Date of evaluation: 10/27/2024  Provider: LOIS Ríos CNP  Note Started: 11:12 PM EDT 10/27/24        Chief Complaint:    Chief Complaint   Patient presents with    Fall     Pt fell yesterday and his LT heel has been hurting too bad to put his work boots on. Its a little bit better today but would like it checked out.          Nursing Notes, Past Medical Hx, Past Surgical Hx, Social Hx, Allergies, and Family Hx were all reviewed and agreed with or any disagreements were addressed in the HPI.    HPI: (Location, Duration, Timing, Severity, Quality, Assoc Sx, Context, Modifying factors)    History From: Patient  Limitations to history : None    Social Determinants Significantly Affecting Health : None    Chief Complaint    This is a  24 y.o. male who presents to the emergency department today for evaluation of left heel and left ankle pain.  Patient reports sustaining a fall yesterday injuring the affected extremity.  He denies previous heel or ankle injuries.  He reports pain level of 6 out of 10.  He describes the pain as constant, dull, and aching with sharp pains that occur with movement and with weightbearing.  Patient states that he has to wear steel toe boots for work and states that he has been unable to put his boot on because of his symptoms.  The pain is nonmigratory.  He has not taken anything for his symptoms.  He states that he has otherwise felt well and has been without fever, chills, or other symptoms.    PastMedical/Surgical History:      Diagnosis Date    Allergic rhinitis      No past surgical history on file.    Medications:  Discharge Medication List as of 10/27/2024 11:20 PM        CONTINUE these medications which have NOT CHANGED    Details   ondansetron (ZOFRAN-ODT) 4 MG

## 2024-10-28 NOTE — DISCHARGE INSTRUCTIONS
Apply ice to the affected area for 20 minutes every 3-4 hours.  Keep the affected extremity elevated is much as possible.  Wear Ace wrap as directed.  Take OTC Tylenol or ibuprofen as directed for discomfort.  Follow-up with primary care provider as well as with Ortho as directed.

## 2024-10-28 NOTE — ED TRIAGE NOTES
Pt fell yesterday and his LT heel has been hurting too bad to put his work boots on. Its a little bit better today but would like it checked out.

## 2024-12-26 ENCOUNTER — HOSPITAL ENCOUNTER (EMERGENCY)
Age: 24
Discharge: HOME | End: 2024-12-26

## 2024-12-26 VITALS
SYSTOLIC BLOOD PRESSURE: 154 MMHG | WEIGHT: 251.99 LBS | RESPIRATION RATE: 18 BRPM | TEMPERATURE: 98.6 F | BODY MASS INDEX: 36.07 KG/M2 | HEIGHT: 70 IN | OXYGEN SATURATION: 99 % | HEART RATE: 77 BPM | DIASTOLIC BLOOD PRESSURE: 106 MMHG

## 2024-12-26 DIAGNOSIS — R03.0 ELEVATED BLOOD PRESSURE READING: ICD-10-CM

## 2024-12-26 DIAGNOSIS — U07.1 COVID-19 VIRUS INFECTION: Primary | ICD-10-CM

## 2024-12-26 LAB
FLUAV + FLUBV AG NOSE IA.RAPID: NOT DETECTED
FLUAV + FLUBV AG NOSE IA.RAPID: NOT DETECTED
S PYO AG THROAT QL: NEGATIVE
SARS-COV-2 RDRP RESP QL NAA+PROBE: DETECTED

## 2024-12-26 PROCEDURE — 99283 EMERGENCY DEPT VISIT LOW MDM: CPT

## 2024-12-26 PROCEDURE — 87635 SARS-COV-2 COVID-19 AMP PRB: CPT

## 2024-12-26 PROCEDURE — 87880 STREP A ASSAY W/OPTIC: CPT

## 2024-12-26 PROCEDURE — 87502 INFLUENZA DNA AMP PROBE: CPT

## 2024-12-26 RX ORDER — HYDROXYZINE PAMOATE 25 MG/1
25 CAPSULE ORAL NIGHTLY PRN
Qty: 5 CAPSULE | Refills: 0 | Status: SHIPPED | OUTPATIENT
Start: 2024-12-26

## 2024-12-26 RX ORDER — IBUPROFEN 800 MG/1
800 TABLET, FILM COATED ORAL EVERY 8 HOURS PRN
Qty: 20 TABLET | Refills: 0 | Status: SHIPPED | OUTPATIENT
Start: 2024-12-26

## 2024-12-26 RX ORDER — ONDANSETRON 4 MG/1
4 TABLET, ORALLY DISINTEGRATING ORAL EVERY 8 HOURS PRN
Qty: 10 TABLET | Refills: 0 | Status: SHIPPED | OUTPATIENT
Start: 2024-12-26

## 2024-12-26 RX ORDER — ACETAMINOPHEN 325 MG/1
650 TABLET ORAL EVERY 6 HOURS PRN
Qty: 30 TABLET | Refills: 0 | Status: SHIPPED | OUTPATIENT
Start: 2024-12-26

## 2024-12-26 RX ORDER — BENZONATATE 200 MG/1
200 CAPSULE ORAL 3 TIMES DAILY PRN
Qty: 20 CAPSULE | Refills: 0 | Status: SHIPPED | OUTPATIENT
Start: 2024-12-26

## 2024-12-26 ASSESSMENT — PAIN DESCRIPTION - PAIN TYPE: TYPE: ACUTE PAIN

## 2024-12-26 ASSESSMENT — PAIN SCALES - GENERAL: PAINLEVEL_OUTOF10: 5

## 2024-12-26 ASSESSMENT — PAIN - FUNCTIONAL ASSESSMENT: PAIN_FUNCTIONAL_ASSESSMENT: 0-10

## 2024-12-27 NOTE — ED PROVIDER NOTES
[facility] EMERGENCY DEPARTMENT  EMERGENCY DEPARTMENT ENCOUNTER        Pt Name: Dung Johns  MRN: [id number]  Birthdate 2000  Date of evaluation: 12/26/2024  Provider: Sofi Wan PA-C  PCP: No primary care provider on file.  Note Started: 9:17 PM EST 12/26/24      BUZZ. I have evaluated this patient.        CHIEF COMPLAINT       Chief Complaint   Patient presents with    Fever     Patient complains of fever, body aches and chills x2days. Patient is around a lot of people and thinks that he picked up a virus. OTC medications are not helping.        HISTORY OF PRESENT ILLNESS: 1 or more Elements     History From: patient    Dung Johns is a 24 y.o. male who presents for fever, body aches, scratchy throat, and cough.  Symptoms started yesterday with scratchy throat and then last night progressed to subjective fever and body aches.  Cough is productive of a green sputum.  No hemoptysis.  Denies SOB.  Has nausea and vomited few times, both post tussive and non post tussive.  Denies hematemesis or coffee ground emesis.  Feels similar to prior episode of COVID.  He is a  for the [employer] so is exposed to a lot of people.    Nursing Notes were reviewed and agreed with or any disagreements were addressed in the HPI.    REVIEW OF SYSTEMS :      Review of Systems    Positives and Pertinent negatives as per HPI.     SURGICAL HISTORY   No past surgical history on file.    CURRENTMEDICATIONS       Discharge Medication List as of 12/26/2024  9:28 PM          ALLERGIES     Patient has no known allergies.    FAMILYHISTORY     No family history on file.     SOCIAL HISTORY       Social History     Tobacco Use    Smoking status: Passive Smoke Exposure - Never Smoker    Smokeless tobacco: Never   Substance Use Topics    Alcohol use: No    Drug use: No       SCREENINGS        [place] Coma Scale  Eye Opening: Spontaneous  Best Verbal Response: Oriented  Best Motor

## 2025-03-11 ENCOUNTER — APPOINTMENT (OUTPATIENT)
Dept: GENERAL RADIOLOGY | Age: 25
End: 2025-03-11

## 2025-03-11 ENCOUNTER — HOSPITAL ENCOUNTER (EMERGENCY)
Age: 25
Discharge: HOME | End: 2025-03-11

## 2025-03-11 VITALS
RESPIRATION RATE: 18 BRPM | BODY MASS INDEX: 36.69 KG/M2 | OXYGEN SATURATION: 100 % | SYSTOLIC BLOOD PRESSURE: 141 MMHG | HEART RATE: 80 BPM | DIASTOLIC BLOOD PRESSURE: 84 MMHG | TEMPERATURE: 98.8 F | WEIGHT: 255.73 LBS

## 2025-03-11 DIAGNOSIS — R07.9 CHEST PAIN, UNSPECIFIED TYPE: Primary | ICD-10-CM

## 2025-03-11 LAB
ALBUMIN SERPL-MCNC: 4.5 G/DL (ref 3.4–5)
ALBUMIN/GLOB SERPL: 1.5 {RATIO} (ref 1.1–2.2)
ALP SERPL-CCNC: 70 U/L (ref 40–129)
ALT SERPL-CCNC: 18 U/L (ref 10–40)
ANION GAP SERPL CALCULATED.3IONS-SCNC: 9 MMOL/L (ref 3–16)
AST SERPL-CCNC: 25 U/L (ref 15–37)
BASOPHILS # BLD: 0 K/UL (ref 0–0.2)
BASOPHILS NFR BLD: 0.2 %
BILIRUB SERPL-MCNC: 0.3 MG/DL (ref 0–1)
BUN SERPL-MCNC: 7 MG/DL (ref 7–20)
CALCIUM SERPL-MCNC: 9.5 MG/DL (ref 8.3–10.6)
CHLORIDE SERPL-SCNC: 104 MMOL/L (ref 99–110)
CO2 SERPL-SCNC: 28 MMOL/L (ref 21–32)
CREAT SERPL-MCNC: 1.1 MG/DL (ref 0.9–1.3)
DEPRECATED RDW RBC AUTO: 13.7 % (ref 12.4–15.4)
EKG ATRIAL RATE: 65 BPM
EKG DIAGNOSIS: NORMAL
EKG P AXIS: 37 DEGREES
EKG P-R INTERVAL: 170 MS
EKG Q-T INTERVAL: 394 MS
EKG QRS DURATION: 100 MS
EKG QTC CALCULATION (BAZETT): 409 MS
EKG R AXIS: 2 DEGREES
EKG T AXIS: 19 DEGREES
EKG VENTRICULAR RATE: 65 BPM
EOSINOPHIL # BLD: 0.1 K/UL (ref 0–0.6)
EOSINOPHIL NFR BLD: 1 %
GFR SERPLBLD CREATININE-BSD FMLA CKD-EPI: >90 ML/MIN/{1.73_M2}
GLUCOSE SERPL-MCNC: 109 MG/DL (ref 70–99)
HCT VFR BLD AUTO: 43 % (ref 40.5–52.5)
HGB BLD-MCNC: 14.5 G/DL (ref 13.5–17.5)
LYMPHOCYTES # BLD: 1 K/UL (ref 1–5.1)
LYMPHOCYTES NFR BLD: 17 %
MCH RBC QN AUTO: 27.9 PG (ref 26–34)
MCHC RBC AUTO-ENTMCNC: 33.7 G/DL (ref 31–36)
MCV RBC AUTO: 82.7 FL (ref 80–100)
MONOCYTES # BLD: 0.4 K/UL (ref 0–1.3)
MONOCYTES NFR BLD: 6.1 %
NEUTROPHILS # BLD: 4.4 K/UL (ref 1.7–7.7)
NEUTROPHILS NFR BLD: 75.7 %
PLATELET # BLD AUTO: 237 K/UL (ref 135–450)
PMV BLD AUTO: 7.8 FL (ref 5–10.5)
POTASSIUM SERPL-SCNC: 3.9 MMOL/L (ref 3.5–5.1)
PROT SERPL-MCNC: 7.6 G/DL (ref 6.4–8.2)
RBC # BLD AUTO: 5.2 M/UL (ref 4.2–5.9)
SODIUM SERPL-SCNC: 141 MMOL/L (ref 136–145)
TROPONIN, HIGH SENSITIVITY: 9 NG/L (ref 0–22)
TROPONIN, HIGH SENSITIVITY: <6 NG/L (ref 0–22)
WBC # BLD AUTO: 5.9 K/UL (ref 4–11)

## 2025-03-11 PROCEDURE — 80053 COMPREHEN METABOLIC PANEL: CPT

## 2025-03-11 PROCEDURE — 93005 ELECTROCARDIOGRAM TRACING: CPT | Performed by: EMERGENCY MEDICINE

## 2025-03-11 PROCEDURE — 71045 X-RAY EXAM CHEST 1 VIEW: CPT

## 2025-03-11 PROCEDURE — 85025 COMPLETE CBC W/AUTO DIFF WBC: CPT

## 2025-03-11 PROCEDURE — 84484 ASSAY OF TROPONIN QUANT: CPT

## 2025-03-11 PROCEDURE — 36415 COLL VENOUS BLD VENIPUNCTURE: CPT

## 2025-03-11 PROCEDURE — 99285 EMERGENCY DEPT VISIT HI MDM: CPT

## 2025-03-11 PROCEDURE — 93010 ELECTROCARDIOGRAM REPORT: CPT | Performed by: INTERNAL MEDICINE

## 2025-03-11 ASSESSMENT — PAIN DESCRIPTION - DESCRIPTORS: DESCRIPTORS: ACHING

## 2025-03-11 ASSESSMENT — ENCOUNTER SYMPTOMS
RHINORRHEA: 0
VOMITING: 0
ABDOMINAL PAIN: 0
CONSTIPATION: 0
NAUSEA: 0
COUGH: 0
SHORTNESS OF BREATH: 0
BACK PAIN: 0
SORE THROAT: 0
EYE PAIN: 0
DIARRHEA: 0

## 2025-03-11 ASSESSMENT — PAIN DESCRIPTION - ONSET: ONSET: ON-GOING

## 2025-03-11 ASSESSMENT — PAIN DESCRIPTION - PAIN TYPE: TYPE: ACUTE PAIN

## 2025-03-11 ASSESSMENT — PAIN SCALES - GENERAL
PAINLEVEL_OUTOF10: 5
PAINLEVEL_OUTOF10: 3

## 2025-03-11 ASSESSMENT — PAIN DESCRIPTION - LOCATION: LOCATION: CHEST

## 2025-03-11 ASSESSMENT — HEART SCORE
ECG: NORMAL
ECG: NORMAL

## 2025-03-11 ASSESSMENT — PAIN DESCRIPTION - FREQUENCY: FREQUENCY: CONTINUOUS

## 2025-03-11 ASSESSMENT — PAIN DESCRIPTION - ORIENTATION: ORIENTATION: LEFT
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
## 2025-04-15 ENCOUNTER — PATIENT MESSAGE (OUTPATIENT)
Age: 25
End: 2025-04-15